# Patient Record
Sex: MALE | URBAN - METROPOLITAN AREA
[De-identification: names, ages, dates, MRNs, and addresses within clinical notes are randomized per-mention and may not be internally consistent; named-entity substitution may affect disease eponyms.]

---

## 2022-07-09 ENCOUNTER — TELEPHONE ENCOUNTER (OUTPATIENT)
Dept: URBAN - METROPOLITAN AREA CLINIC 121 | Facility: CLINIC | Age: 72
End: 2022-07-09

## 2022-07-10 ENCOUNTER — TELEPHONE ENCOUNTER (OUTPATIENT)
Dept: URBAN - METROPOLITAN AREA CLINIC 121 | Facility: CLINIC | Age: 72
End: 2022-07-10

## 2022-12-05 LAB
ALBUMIN SERPL-MCNC: 4.3 G/DL
ALP BLD-CCNC: 89 U/L
ALT SERPL-CCNC: 29 U/L
ANION GAP SERPL CALCULATED.3IONS-SCNC: 17 MMOL/L
AST SERPL-CCNC: 39 U/L
BASOPHILS ABSOLUTE: ABNORMAL
BASOPHILS RELATIVE PERCENT: ABNORMAL
BILIRUB SERPL-MCNC: 0.6 MG/DL (ref 0.1–1.4)
BUN BLDV-MCNC: 48 MG/DL
CALCIUM SERPL-MCNC: 9.5 MG/DL
CHLORIDE BLD-SCNC: 98 MMOL/L
CO2: 26 MMOL/L
CREAT SERPL-MCNC: 2.8 MG/DL
EGFR: 22
EOSINOPHILS ABSOLUTE: ABNORMAL
EOSINOPHILS RELATIVE PERCENT: ABNORMAL
GLUCOSE BLD-MCNC: 89 MG/DL
HCT VFR BLD CALC: 37.9 % (ref 41–53)
HEMOGLOBIN: 12.2 G/DL (ref 13.5–17.5)
LYMPHOCYTES ABSOLUTE: ABNORMAL
LYMPHOCYTES RELATIVE PERCENT: ABNORMAL
MAGNESIUM: 2.3 MG/DL
MCH RBC QN AUTO: ABNORMAL PG
MCHC RBC AUTO-ENTMCNC: ABNORMAL G/DL
MCV RBC AUTO: ABNORMAL FL
MONOCYTES ABSOLUTE: ABNORMAL
MONOCYTES RELATIVE PERCENT: ABNORMAL
NEUTROPHILS ABSOLUTE: ABNORMAL
NEUTROPHILS RELATIVE PERCENT: ABNORMAL
PLATELET # BLD: 212 K/ΜL
PMV BLD AUTO: ABNORMAL FL
POTASSIUM SERPL-SCNC: 4.4 MMOL/L
RBC # BLD: 4.04 10^6/ΜL
SODIUM BLD-SCNC: 141 MMOL/L
T4 FREE: 1.12
TOTAL PROTEIN: 7.6
TSH SERPL DL<=0.05 MIU/L-ACNC: 1.58 UIU/ML
WBC # BLD: 18.1 10^3/ML

## 2023-01-17 LAB
ALBUMIN SERPL-MCNC: 3.2 G/DL
ALP BLD-CCNC: 155 U/L
ALT SERPL-CCNC: 64 U/L
ANION GAP SERPL CALCULATED.3IONS-SCNC: 19 MMOL/L
AST SERPL-CCNC: 68 U/L
BILIRUB SERPL-MCNC: 0.6 MG/DL (ref 0.1–1.4)
BUN BLDV-MCNC: 92 MG/DL
CALCIUM SERPL-MCNC: 8.6 MG/DL
CHLORIDE BLD-SCNC: 96 MMOL/L
CO2: 17 MMOL/L
CREAT SERPL-MCNC: 4.8 MG/DL
EGFR: 11
GLUCOSE BLD-MCNC: 154 MG/DL
POTASSIUM SERPL-SCNC: 4.2 MMOL/L
SODIUM BLD-SCNC: 132 MMOL/L
TOTAL PROTEIN: 7.2

## 2023-01-19 LAB
BILIRUBIN, URINE: NEGATIVE
BLOOD, URINE: POSITIVE
CLARITY: CLEAR
COLOR: YELLOW
GLUCOSE URINE: NEGATIVE
KETONES, URINE: NEGATIVE
LEUKOCYTE ESTERASE, URINE: ABNORMAL
NITRITE, URINE: NEGATIVE
PH UA: 5 (ref 4.5–8)
PROTEIN UA: POSITIVE
SPECIFIC GRAVITY, URINE: 1.01
UROBILINOGEN, URINE: NORMAL

## 2023-01-20 LAB
BUN BLDV-MCNC: 62 MG/DL
CALCIUM SERPL-MCNC: 8.1 MG/DL
CHLORIDE BLD-SCNC: 106 MMOL/L
CO2: 20 MMOL/L
CREAT SERPL-MCNC: 3 MG/DL
EGFR: 20
GLUCOSE BLD-MCNC: 139 MG/DL
POTASSIUM SERPL-SCNC: 4.1 MMOL/L
SODIUM BLD-SCNC: 139 MMOL/L
T4 FREE: 1.04
TSH SERPL DL<=0.05 MIU/L-ACNC: 0.78 UIU/ML

## 2023-01-21 LAB
BUN BLDV-MCNC: 50 MG/DL
CALCIUM SERPL-MCNC: 8.1 MG/DL
CHLORIDE BLD-SCNC: 107 MMOL/L
CO2: 22 MMOL/L
CREAT SERPL-MCNC: 2.6 MG/DL
EGFR: 24
GLUCOSE BLD-MCNC: 75 MG/DL
POTASSIUM SERPL-SCNC: 4.2 MMOL/L
SODIUM BLD-SCNC: 142 MMOL/L

## 2023-01-30 LAB
ALBUMIN: 3.8
AVERAGE GLUCOSE: 143
BUN BLDV-MCNC: 34 MG/DL
CALCIUM SERPL-MCNC: 9.1 MG/DL
CHLORIDE BLD-SCNC: 103 MMOL/L
CO2: 25 MMOL/L
CREAT SERPL-MCNC: 1.8 MG/DL
EGFR: 37
GLUCOSE BLD-MCNC: 174 MG/DL
HBA1C MFR BLD: 6.6 %
PHOSPHORUS: 3.7 MG/DL
POTASSIUM SERPL-SCNC: 5.5 MMOL/L
SODIUM BLD-SCNC: 141 MMOL/L

## 2023-03-09 PROBLEM — R33.9 RETENTION OF URINE: Status: ACTIVE | Noted: 2023-02-05

## 2023-03-09 PROBLEM — E11.9 DIABETES (HCC): Status: ACTIVE | Noted: 2022-12-06

## 2023-03-09 PROBLEM — N17.9 AKI (ACUTE KIDNEY INJURY) (HCC): Status: ACTIVE | Noted: 2022-12-06

## 2023-03-09 PROBLEM — G89.29 CHRONIC BILATERAL LOW BACK PAIN WITHOUT SCIATICA: Status: ACTIVE | Noted: 2020-02-25

## 2023-03-09 PROBLEM — N40.1 BPH WITH OBSTRUCTION/LOWER URINARY TRACT SYMPTOMS: Status: ACTIVE | Noted: 2023-02-05

## 2023-03-09 PROBLEM — E10.9: Status: ACTIVE | Noted: 2019-05-10

## 2023-03-09 PROBLEM — I44.2 COMPLETE HEART BLOCK (HCC): Status: ACTIVE | Noted: 2020-03-02

## 2023-03-09 PROBLEM — M25.511 RIGHT SHOULDER PAIN: Status: ACTIVE | Noted: 2020-02-25

## 2023-03-09 PROBLEM — I73.9 PAD (PERIPHERAL ARTERY DISEASE) (HCC): Status: ACTIVE | Noted: 2020-03-02

## 2023-03-09 PROBLEM — R97.20 ELEVATED PSA: Status: ACTIVE | Noted: 2019-05-10

## 2023-03-09 PROBLEM — N13.8 BPH WITH OBSTRUCTION/LOWER URINARY TRACT SYMPTOMS: Status: ACTIVE | Noted: 2023-02-05

## 2023-03-09 PROBLEM — N18.4 STAGE 4 CHRONIC KIDNEY DISEASE (HCC): Status: ACTIVE | Noted: 2020-02-25

## 2023-03-09 PROBLEM — M62.82 RHABDOMYOLYSIS: Status: ACTIVE | Noted: 2022-12-06

## 2023-03-09 PROBLEM — M51.36 DEGENERATIVE DISC DISEASE, LUMBAR: Status: ACTIVE | Noted: 2019-05-10

## 2023-03-09 PROBLEM — R00.1 BRADYCARDIA: Status: ACTIVE | Noted: 2021-08-09

## 2023-03-09 PROBLEM — M54.50 CHRONIC BILATERAL LOW BACK PAIN WITHOUT SCIATICA: Status: ACTIVE | Noted: 2020-02-25

## 2023-03-09 PROBLEM — E55.9 VITAMIN D DEFICIENCY: Status: ACTIVE | Noted: 2019-09-16

## 2023-03-09 PROBLEM — E11.42 DIABETIC POLYNEUROPATHY ASSOCIATED WITH TYPE 2 DIABETES MELLITUS (HCC): Status: ACTIVE | Noted: 2019-12-27

## 2023-03-09 PROBLEM — U07.1 COVID-19: Status: ACTIVE | Noted: 2021-08-10

## 2023-03-09 PROBLEM — R26.2 TROUBLE WALKING: Status: ACTIVE | Noted: 2020-02-25

## 2023-03-09 PROBLEM — Z95.0 PACEMAKER: Status: ACTIVE | Noted: 2020-06-02

## 2023-03-09 PROBLEM — I11.9 HYPERTENSIVE LEFT VENTRICULAR HYPERTROPHY, WITHOUT HEART FAILURE: Status: ACTIVE | Noted: 2019-05-20

## 2023-03-09 PROBLEM — I10 ESSENTIAL HYPERTENSION: Status: ACTIVE | Noted: 2019-05-10

## 2023-03-09 RX ORDER — MV-MN/IRON/FA/K1/RESV/LUT/HERB 18 MG-240
1 TABLET ORAL DAILY
COMMUNITY

## 2023-03-09 RX ORDER — PSEUDOEPHEDRINE HCL 30 MG
TABLET ORAL DAILY
COMMUNITY

## 2023-03-09 RX ORDER — ACETAMINOPHEN 325 MG/1
TABLET ORAL 3 TIMES DAILY
COMMUNITY
Start: 2020-03-04

## 2023-03-09 RX ORDER — MV-MN/IRON/FA/K1/RESV/LUT/HERB 18 MG-240
TABLET ORAL DAILY
COMMUNITY
End: 2023-03-09

## 2023-03-09 RX ORDER — CLOPIDOGREL BISULFATE 75 MG/1
TABLET ORAL DAILY
COMMUNITY
Start: 2023-03-03

## 2023-03-09 RX ORDER — FAMOTIDINE 20 MG/1
TABLET, FILM COATED ORAL 2 TIMES DAILY
COMMUNITY

## 2023-03-09 RX ORDER — INSULIN GLARGINE 100 [IU]/ML
INJECTION, SOLUTION SUBCUTANEOUS
COMMUNITY
Start: 2022-09-14

## 2023-03-09 RX ORDER — ASPIRIN 81 MG/1
TABLET, CHEWABLE ORAL NIGHTLY
COMMUNITY

## 2023-03-09 RX ORDER — MULTIVITAMIN WITH IRON
1 TABLET ORAL
COMMUNITY

## 2023-03-09 RX ORDER — ATORVASTATIN CALCIUM 20 MG/1
TABLET, FILM COATED ORAL NIGHTLY
COMMUNITY

## 2023-03-09 RX ORDER — GABAPENTIN 300 MG/1
CAPSULE ORAL DAILY
COMMUNITY
Start: 2022-12-08

## 2023-03-09 RX ORDER — FUROSEMIDE 40 MG/1
TABLET ORAL DAILY
COMMUNITY
Start: 2021-07-31

## 2023-03-09 RX ORDER — LIDOCAINE 50 MG/G
1 PATCH TOPICAL EVERY 24 HOURS
COMMUNITY

## 2023-03-09 RX ORDER — TAMSULOSIN HYDROCHLORIDE 0.4 MG/1
CAPSULE ORAL DAILY
COMMUNITY
Start: 2023-02-02

## 2023-03-13 DIAGNOSIS — I10 ESSENTIAL HYPERTENSION: Primary | ICD-10-CM

## 2023-03-13 NOTE — PROGRESS NOTES
Left message informing pt to get a BMP done before his appt Friday. Asked for a call back to let us know where to fax the order to.

## 2023-05-19 LAB
ALBUMIN SERPL-MCNC: 3.9 G/DL
ALP BLD-CCNC: 78 U/L
ALT SERPL-CCNC: 13 U/L
ANION GAP SERPL CALCULATED.3IONS-SCNC: 13 MMOL/L
AST SERPL-CCNC: 16 U/L
AVERAGE GLUCOSE: 128
BASOPHILS ABSOLUTE: ABNORMAL
BASOPHILS RELATIVE PERCENT: ABNORMAL
BILIRUB SERPL-MCNC: 0.3 MG/DL (ref 0.1–1.4)
BUN BLDV-MCNC: 47 MG/DL
CALCIUM SERPL-MCNC: 9.3 MG/DL
CHLORIDE BLD-SCNC: 107 MMOL/L
CHOLESTEROL, TOTAL: 101 MG/DL
CHOLESTEROL/HDL RATIO: ABNORMAL
CO2: 21 MMOL/L
CREAT SERPL-MCNC: 2.2 MG/DL
EGFR: 29
EOSINOPHILS ABSOLUTE: ABNORMAL
EOSINOPHILS RELATIVE PERCENT: ABNORMAL
GLUCOSE BLD-MCNC: 71 MG/DL
HBA1C MFR BLD: 6.1 %
HCT VFR BLD CALC: 32.1 % (ref 41–53)
HDLC SERPL-MCNC: 29 MG/DL (ref 35–70)
HEMOGLOBIN: 10 G/DL (ref 13.5–17.5)
IRON SATURATION: 22
IRON: 44
LDL CHOLESTEROL CALCULATED: 60 MG/DL (ref 0–160)
LYMPHOCYTES ABSOLUTE: ABNORMAL
LYMPHOCYTES RELATIVE PERCENT: ABNORMAL
MCH RBC QN AUTO: ABNORMAL PG
MCHC RBC AUTO-ENTMCNC: ABNORMAL G/DL
MCV RBC AUTO: ABNORMAL FL
MONOCYTES ABSOLUTE: ABNORMAL
MONOCYTES RELATIVE PERCENT: ABNORMAL
NEUTROPHILS ABSOLUTE: ABNORMAL
NEUTROPHILS RELATIVE PERCENT: ABNORMAL
NONHDLC SERPL-MCNC: ABNORMAL MG/DL
PLATELET # BLD: 190 K/ΜL
PMV BLD AUTO: ABNORMAL FL
POTASSIUM SERPL-SCNC: 4.4 MMOL/L
RBC # BLD: 3.43 10^6/ΜL
SODIUM BLD-SCNC: 141 MMOL/L
TOTAL IRON BINDING CAPACITY: 199
TOTAL PROTEIN: 6.7
TRIGL SERPL-MCNC: 62 MG/DL
VLDLC SERPL CALC-MCNC: 12 MG/DL
WBC # BLD: 8.4 10^3/ML

## 2023-07-21 ENCOUNTER — OFFICE VISIT (OUTPATIENT)
Dept: NEPHROLOGY | Age: 73
End: 2023-07-21
Payer: MEDICARE

## 2023-07-21 VITALS
DIASTOLIC BLOOD PRESSURE: 66 MMHG | OXYGEN SATURATION: 97 % | WEIGHT: 278 LBS | HEART RATE: 69 BPM | SYSTOLIC BLOOD PRESSURE: 127 MMHG

## 2023-07-21 DIAGNOSIS — I12.9 HYPERTENSIVE RENAL DISEASE, STAGE 1 THROUGH STAGE 4 OR UNSPECIFIED CHRONIC KIDNEY DISEASE: ICD-10-CM

## 2023-07-21 DIAGNOSIS — N17.9 AKI (ACUTE KIDNEY INJURY) (HCC): Primary | ICD-10-CM

## 2023-07-21 DIAGNOSIS — N18.31 CHRONIC KIDNEY DISEASE, STAGE 3A (HCC): ICD-10-CM

## 2023-07-21 PROCEDURE — 1036F TOBACCO NON-USER: CPT | Performed by: INTERNAL MEDICINE

## 2023-07-21 PROCEDURE — G8421 BMI NOT CALCULATED: HCPCS | Performed by: INTERNAL MEDICINE

## 2023-07-21 PROCEDURE — 99204 OFFICE O/P NEW MOD 45 MIN: CPT | Performed by: INTERNAL MEDICINE

## 2023-07-21 PROCEDURE — 1123F ACP DISCUSS/DSCN MKR DOCD: CPT | Performed by: INTERNAL MEDICINE

## 2023-07-21 PROCEDURE — 3074F SYST BP LT 130 MM HG: CPT | Performed by: INTERNAL MEDICINE

## 2023-07-21 PROCEDURE — 3078F DIAST BP <80 MM HG: CPT | Performed by: INTERNAL MEDICINE

## 2023-07-21 PROCEDURE — G8427 DOCREV CUR MEDS BY ELIG CLIN: HCPCS | Performed by: INTERNAL MEDICINE

## 2023-07-21 PROCEDURE — 3017F COLORECTAL CA SCREEN DOC REV: CPT | Performed by: INTERNAL MEDICINE

## 2023-07-21 NOTE — PROGRESS NOTES
125-130 range. 3. HTN: on lopressor and lasix (he takes it as needed). He has some edema. Pt reports he was previously on lisinopril but due to low BP it was taken off.   4. Hx PPM  5. BPH s/p TURP    Thought process was discussed with the patient and wife  Thank you for the referral  Please do not hesitate to contact me if you have any questions or concerns  I will make further recommendations depending on clinical course and lab/diagnostic results    Orders Placed This Encounter   Procedures    US RENAL COMPLETE    Basic Metabolic Panel    Protein / creatinine ratio, urine    Urinalysis    CBC    PTH, Intact    Phosphorus    Vitamin D 25 Hydroxy     Return in about 6 weeks (around 9/1/2023).     Kirk Simon MD  [facility]

## 2023-07-24 ENCOUNTER — TELEPHONE (OUTPATIENT)
Dept: NEPHROLOGY | Age: 73
End: 2023-07-24

## 2023-07-24 NOTE — TELEPHONE ENCOUNTER
----- Message from Elysia Gordon MD sent at 7/23/2023  6:55 PM EDT -----  Pls obtain notes from nephrologist, [place], Dr. Abad Carrillo

## 2023-07-24 NOTE — TELEPHONE ENCOUNTER
Spoke to Dr. Leeann Clay office, they informed to fax letter head with what we need to them. Faxed letterhead.

## 2023-08-01 DIAGNOSIS — N17.9 AKI (ACUTE KIDNEY INJURY) (HCC): ICD-10-CM

## 2023-08-01 DIAGNOSIS — I12.9 HYPERTENSIVE RENAL DISEASE, STAGE 1 THROUGH STAGE 4 OR UNSPECIFIED CHRONIC KIDNEY DISEASE: ICD-10-CM

## 2023-08-01 DIAGNOSIS — N18.31 CHRONIC KIDNEY DISEASE, STAGE 3A (HCC): ICD-10-CM

## 2023-08-02 LAB
BILIRUBIN, URINE: NEGATIVE
BLOOD, URINE: POSITIVE
BUN BLDV-MCNC: 39 MG/DL
CALCIUM SERPL-MCNC: 9.4 MG/DL
CHLORIDE BLD-SCNC: 108 MMOL/L
CLARITY: CLEAR
CO2: 22 MMOL/L
COLOR: YELLOW
CREAT SERPL-MCNC: 2.1 MG/DL
EGFR: 31
GLUCOSE BLD-MCNC: 83 MG/DL
GLUCOSE URINE: NEGATIVE
KETONES, URINE: NEGATIVE
LEUKOCYTE ESTERASE, URINE: NEGATIVE
NITRITE, URINE: NEGATIVE
PH UA: 6 (ref 4.5–8)
PHOSPHORUS: 4.8 MG/DL
POTASSIUM SERPL-SCNC: 4.8 MMOL/L
PROTEIN UA: POSITIVE
PTH INTACT: 30
SODIUM BLD-SCNC: 144 MMOL/L
SPECIFIC GRAVITY, URINE: 1.02
UROBILINOGEN, URINE: NORMAL
VITAMIN D 25-HYDROXY: 57
VITAMIN D2, 25 HYDROXY: NORMAL
VITAMIN D3,25 HYDROXY: NORMAL

## 2023-08-30 ENCOUNTER — OFFICE VISIT (OUTPATIENT)
Dept: NEPHROLOGY | Age: 73
End: 2023-08-30
Payer: MEDICARE

## 2023-08-30 VITALS — SYSTOLIC BLOOD PRESSURE: 124 MMHG | HEART RATE: 75 BPM | DIASTOLIC BLOOD PRESSURE: 63 MMHG | OXYGEN SATURATION: 96 %

## 2023-08-30 DIAGNOSIS — E11.21 DIABETIC NEPHROPATHY ASSOCIATED WITH TYPE 2 DIABETES MELLITUS (HCC): ICD-10-CM

## 2023-08-30 DIAGNOSIS — N18.32 CHRONIC KIDNEY DISEASE, STAGE 3B (HCC): Primary | ICD-10-CM

## 2023-08-30 DIAGNOSIS — I12.9 HYPERTENSIVE RENAL DISEASE, STAGE 1 THROUGH STAGE 4 OR UNSPECIFIED CHRONIC KIDNEY DISEASE: ICD-10-CM

## 2023-08-30 PROCEDURE — 3074F SYST BP LT 130 MM HG: CPT | Performed by: INTERNAL MEDICINE

## 2023-08-30 PROCEDURE — 2022F DILAT RTA XM EVC RTNOPTHY: CPT | Performed by: INTERNAL MEDICINE

## 2023-08-30 PROCEDURE — 3044F HG A1C LEVEL LT 7.0%: CPT | Performed by: INTERNAL MEDICINE

## 2023-08-30 PROCEDURE — G8421 BMI NOT CALCULATED: HCPCS | Performed by: INTERNAL MEDICINE

## 2023-08-30 PROCEDURE — 99213 OFFICE O/P EST LOW 20 MIN: CPT | Performed by: INTERNAL MEDICINE

## 2023-08-30 PROCEDURE — 1123F ACP DISCUSS/DSCN MKR DOCD: CPT | Performed by: INTERNAL MEDICINE

## 2023-08-30 PROCEDURE — 1036F TOBACCO NON-USER: CPT | Performed by: INTERNAL MEDICINE

## 2023-08-30 PROCEDURE — 3017F COLORECTAL CA SCREEN DOC REV: CPT | Performed by: INTERNAL MEDICINE

## 2023-08-30 PROCEDURE — G8427 DOCREV CUR MEDS BY ELIG CLIN: HCPCS | Performed by: INTERNAL MEDICINE

## 2023-08-30 PROCEDURE — 3078F DIAST BP <80 MM HG: CPT | Performed by: INTERNAL MEDICINE

## 2023-08-30 RX ORDER — LISINOPRIL 5 MG/1
5 TABLET ORAL DAILY
Qty: 90 TABLET | Refills: 1 | Status: SHIPPED | OUTPATIENT
Start: 2023-08-30

## 2023-08-30 NOTE — PROGRESS NOTES
[address] [facility] [address] [place] [address]  Dept: [phone]  Loc: [phone]  Office Progress Note  8/30/2023 3:20 PM      Pt Name:    Keara Mora:    1950  Primary Care Physician:  Nataliia Garcia MD     Chief Complaint:   Chief Complaint   Patient presents with    Chronic Kidney Disease     Stage 3        Background Information/Interval History:   The patient is a 71 y.o. white  malepatient with CKD III, hypertension and diabetes. Reports history of diabetes for 25+ years. Takes insulin x 15+ years. PMHx PPM. Sees Dr. Ashley Patel. Pt reports he was seeing nephrologist in [place] ( [address] [facility], [address] [place] [address])    He is here for follow-up. No sp complaints. Sugars are reported to be in acceptable range per patient. No urinary complaints. Past History:  Past Medical History:   Diagnosis Date    Chronic kidney disease     Diabetes mellitus ([facility] [address] [facility])     Hypertension      No past surgical history on file. VITALS:  /63 (Site: Left Upper Arm, Position: Sitting, Cuff Size: Large Adult)   Pulse 75   SpO2 96%   Wt Readings from Last 3 Encounters:   07/21/23 278 lb (126.1 kg)     There is no height or weight on file to calculate BMI.      General Appearance: alert and cooperative with exam, appears comfortable, no distress  Oral: moist oral mucus membranes  Neck: No jugular venous distention  Lungs: Air entry B/L, no crackles or rales, no use of accessory muscles  Heart: S1, S2 heard  GI: soft, non-tender, no guarding  Extremities: No sig LE edema     Medications:  Current Outpatient Medications   Medication Sig Dispense Refill    Probiotic Product (PROBIOTIC DAILY PO) Take by mouth      acetaminophen (TYLENOL) 325 MG tablet Take by mouth 3 times daily      aspirin 81 MG chewable tablet Take by mouth nightly      atorvastatin (LIPITOR) 20 MG tablet Take by mouth nightly      clopidogrel (PLAVIX) 75 MG tablet

## 2023-12-15 LAB
BUN BLDV-MCNC: 48 MG/DL
CALCIUM SERPL-MCNC: 9 MG/DL
CHLORIDE BLD-SCNC: 108 MMOL/L
CO2: 21 MMOL/L
CREAT SERPL-MCNC: 2.8 MG/DL
EGFR: 22
ESTIMATED AVERAGE GLUCOSE: 131
GLUCOSE BLD-MCNC: 80 MG/DL
HBA1C MFR BLD: 6.2 %
POTASSIUM SERPL-SCNC: 5.5 MMOL/L
SODIUM BLD-SCNC: 141 MMOL/L

## 2024-04-12 LAB
BUN BLDV-MCNC: 52 MG/DL
CALCIUM SERPL-MCNC: 9.4 MG/DL
CHLORIDE BLD-SCNC: 106 MMOL/L
CO2: 23 MMOL/L
CREAT SERPL-MCNC: 2.7 MG/DL
EGFR: 22
GLUCOSE BLD-MCNC: 152 MG/DL
POTASSIUM SERPL-SCNC: 5.1 MMOL/L
SODIUM BLD-SCNC: 142 MMOL/L

## 2024-04-17 ENCOUNTER — OFFICE VISIT (OUTPATIENT)
Dept: NEPHROLOGY | Age: 74
End: 2024-04-17
Payer: MEDICARE

## 2024-04-17 VITALS
OXYGEN SATURATION: 75 % | BODY MASS INDEX: 39.9 KG/M2 | HEIGHT: 71 IN | WEIGHT: 285 LBS | SYSTOLIC BLOOD PRESSURE: 124 MMHG | DIASTOLIC BLOOD PRESSURE: 71 MMHG

## 2024-04-17 DIAGNOSIS — N18.4 CKD (CHRONIC KIDNEY DISEASE), STAGE IV (HCC): Primary | ICD-10-CM

## 2024-04-17 DIAGNOSIS — E87.5 HYPERKALEMIA: ICD-10-CM

## 2024-04-17 DIAGNOSIS — I12.9 HYPERTENSIVE RENAL DISEASE, STAGE 1 THROUGH STAGE 4 OR UNSPECIFIED CHRONIC KIDNEY DISEASE: ICD-10-CM

## 2024-04-17 PROCEDURE — 3017F COLORECTAL CA SCREEN DOC REV: CPT | Performed by: INTERNAL MEDICINE

## 2024-04-17 PROCEDURE — 1123F ACP DISCUSS/DSCN MKR DOCD: CPT | Performed by: INTERNAL MEDICINE

## 2024-04-17 PROCEDURE — 99213 OFFICE O/P EST LOW 20 MIN: CPT | Performed by: INTERNAL MEDICINE

## 2024-04-17 PROCEDURE — 1036F TOBACCO NON-USER: CPT | Performed by: INTERNAL MEDICINE

## 2024-04-17 PROCEDURE — 3078F DIAST BP <80 MM HG: CPT | Performed by: INTERNAL MEDICINE

## 2024-04-17 PROCEDURE — G8427 DOCREV CUR MEDS BY ELIG CLIN: HCPCS | Performed by: INTERNAL MEDICINE

## 2024-04-17 PROCEDURE — 3074F SYST BP LT 130 MM HG: CPT | Performed by: INTERNAL MEDICINE

## 2024-04-17 PROCEDURE — G8417 CALC BMI ABV UP PARAM F/U: HCPCS | Performed by: INTERNAL MEDICINE

## 2024-04-17 NOTE — PROGRESS NOTES
[facility]  [facility]  [address]  [facility] [address]  Dept: [phone]  Loc: [phone]  Office Progress Note  4/17/2024 1:22 PM      Pt Name:    Josh Montes  YOB: 1950  Primary Care Physician:  Preeti Marin MD     Chief Complaint:   Chief Complaint   Patient presents with    Chronic Kidney Disease     Stage 4        Background Information/Interval History:   The patient is a 73 y.o. white  malepatient with CKD III, hypertension and diabetes.  Reports history of diabetes for 25+ years. Takes insulin x 15+ years.  PMHx PPM. Sees Dr. Machuca. Pt reports he was seeing nephrologist in [place] (DR. [place],  [address])    He is here for follow-up. Here for 4  months follow-up. He says sugars are okay. No urinary complaints. Feels better this time. Here with his family member.      Past History:  Past Medical History:   Diagnosis Date    Chronic kidney disease     Diabetes mellitus (HCC)     Hypertension      No past surgical history on file.     VITALS:  /71 (Site: Right Upper Arm, Position: Sitting, Cuff Size: Large Adult)   Ht 1.803 m (5' 11\")   Wt 129.3 kg (285 lb)   SpO2 (!) 75%   BMI 39.75 kg/m²   Wt Readings from Last 3 Encounters:   04/17/24 129.3 kg (285 lb)   12/20/23 129.5 kg (285 lb 6.4 oz)   07/21/23 126.1 kg (278 lb)     Body mass index is 39.75 kg/m².     General Appearance: alert and cooperative with exam, appears comfortable, no distress  Oral: moist oral mucus membranes  Neck: No jugular venous distention  Lungs: Air entry B/L, no crackles or rales, no use of accessory muscles  Heart: S1, S2 heard  GI: soft, non-tender, no guarding  Extremities: No sig LE edema     Medications:  Current Outpatient Medications   Medication Sig Dispense Refill    Probiotic Product (PROBIOTIC DAILY PO) Take by mouth      acetaminophen (TYLENOL) 325 MG tablet Take 1 tablet by mouth as needed for Pain      aspirin